# Patient Record
Sex: MALE | Race: WHITE | Employment: FULL TIME | URBAN - METROPOLITAN AREA
[De-identification: names, ages, dates, MRNs, and addresses within clinical notes are randomized per-mention and may not be internally consistent; named-entity substitution may affect disease eponyms.]

---

## 2017-01-04 ENCOUNTER — TELEPHONE (OUTPATIENT)
Dept: SURGERY | Facility: CLINIC | Age: 57
End: 2017-01-04

## 2017-01-04 NOTE — TELEPHONE ENCOUNTER
Phoned pt and spoke with him. Read to him 's result note as outlined below in this encounter, in it's entirety. This call took appx 15 minutes. Pt understood English and spoke [place], however had to repeat several times and answered many questions.

## 2017-01-12 ENCOUNTER — HOSPITAL ENCOUNTER (OUTPATIENT)
Dept: CT IMAGING | Facility: HOSPITAL | Age: 57
Discharge: HOME | End: 2017-01-12
Attending: UROLOGY
Payer: COMMERCIAL

## 2017-01-12 DIAGNOSIS — R31.29 MICROHEMATURIA: ICD-10-CM

## 2017-01-12 LAB — CREAT BLD-MCNC: 1.3 MG/DL (ref 0.5–1.5)

## 2017-01-12 PROCEDURE — 76376 3D RENDER W/INTRP POSTPROCES: CPT

## 2017-01-12 PROCEDURE — 82565 ASSAY OF CREATININE: CPT

## 2017-01-12 PROCEDURE — 74178 CT ABD&PLV WO CNTR FLWD CNTR: CPT

## 2017-02-02 ENCOUNTER — OFFICE VISIT (OUTPATIENT)
Dept: SURGERY | Facility: CLINIC | Age: 57
End: 2017-02-02

## 2017-02-02 VITALS
SYSTOLIC BLOOD PRESSURE: 152 MMHG | HEART RATE: 72 BPM | RESPIRATION RATE: 16 BRPM | DIASTOLIC BLOOD PRESSURE: 60 MMHG | BODY MASS INDEX: 24.11 KG/M2 | HEIGHT: 66 IN | WEIGHT: 150 LBS | TEMPERATURE: 98 F

## 2017-02-02 DIAGNOSIS — R31.29 MICROHEMATURIA: Primary | ICD-10-CM

## 2017-02-02 PROCEDURE — 52000 CYSTOURETHROSCOPY: CPT | Performed by: UROLOGY

## 2017-02-02 PROCEDURE — 99213 OFFICE O/P EST LOW 20 MIN: CPT | Performed by: UROLOGY

## 2017-02-02 RX ORDER — SILDENAFIL 100 MG/1
100 TABLET, FILM COATED ORAL
Qty: 6 TABLET | Refills: 11 | Status: SHIPPED | OUTPATIENT
Start: 2017-02-02

## 2017-02-02 NOTE — PROGRESS NOTES
Tamiko Nguyen is a 61year old male. HPI:   Patient presents with:  Hematuria: here for cystoscopy      50-year-old male presents for office cystoscopy in follow-up to a previous visit December 15, 2016.   He was referred for persistent microscopic hema ACOSTARSE Disp:  Rfl: 0       Allergies:  No Known Allergies      ROS:       PHYSICAL EXAM:   Karis Barraza  : 1960  Referring Physician: No ref.  provider found     Patient presents with:  Hematuria: here for cystoscopy          CYSTOSCOPY    Anest

## 2017-02-08 ENCOUNTER — TELEPHONE (OUTPATIENT)
Dept: SURGERY | Facility: CLINIC | Age: 57
End: 2017-02-08

## 2017-02-08 NOTE — TELEPHONE ENCOUNTER
Lmtcb. When patient calls back, please transfer to RN [phone] or [phone]. Thanks. Spoke to patient. Patient was given his test results at his last office visit on 2/2/17.

## 2017-02-08 NOTE — TELEPHONE ENCOUNTER
----- Message from Gennaro Palmer MD sent at 1/29/2017  9:47 AM CST -----  Staff please call this patient. Inform him that I have reviewed his CT Urogram.  Findings are as follows:  -There is a mass in the right kidney which contains small amounts of fat.

## 2017-05-19 ENCOUNTER — TELEPHONE (OUTPATIENT)
Dept: SURGERY | Facility: CLINIC | Age: 57
End: 2017-05-19

## 2017-05-19 NOTE — TELEPHONE ENCOUNTER
Phoned Chirstelle back and spoke with her. Informed her the below mentioned cysto was performed in the clinic. She was not aware of this and asked that I fax the notes. Notes faxed as requested to number outlined below. Confirmation of transmission received.

## 2017-05-19 NOTE — TELEPHONE ENCOUNTER
Candida Segundo from [facility] called. Patient had am out patient procedure on 2/2/17 with JOJO. Procedure was denied due to no prior auth. Candida Segundo is asking if you could get a PA for this.  Also, could you please fax over office notes leading up

## 2017-06-11 ENCOUNTER — HOSPITAL ENCOUNTER (OUTPATIENT)
Dept: MRI IMAGING | Facility: HOSPITAL | Age: 57
Discharge: HOME | End: 2017-06-11
Attending: INTERNAL MEDICINE
Payer: COMMERCIAL

## 2017-06-11 DIAGNOSIS — I77.1 STENOSIS OF LEFT ILIAC ARTERY (HCC): ICD-10-CM

## 2017-06-11 PROCEDURE — 82565 ASSAY OF CREATININE: CPT

## 2017-06-11 PROCEDURE — 74185 MRA ABD W OR W/O CNTRST: CPT | Performed by: INTERNAL MEDICINE

## 2017-06-11 PROCEDURE — A9575 INJ GADOTERATE MEGLUMI 0.1ML: HCPCS | Performed by: RADIOLOGY

## 2017-06-19 ENCOUNTER — TELEPHONE (OUTPATIENT)
Dept: SURGERY | Facility: CLINIC | Age: 57
End: 2017-06-19

## 2017-06-19 NOTE — TELEPHONE ENCOUNTER
Dr. Queen Kim office called. Requesting recent office notes. They will be sending  a LETICIA. Please fax to [phone]. Thank you.

## 2018-08-11 ENCOUNTER — APPOINTMENT (OUTPATIENT)
Dept: GENERAL RADIOLOGY | Age: 58
End: 2018-08-11
Attending: EMERGENCY MEDICINE
Payer: COMMERCIAL

## 2018-08-11 ENCOUNTER — HOSPITAL ENCOUNTER (OUTPATIENT)
Age: 58
Discharge: HOME | End: 2018-08-11
Attending: EMERGENCY MEDICINE
Payer: COMMERCIAL

## 2018-08-11 VITALS
RESPIRATION RATE: 16 BRPM | BODY MASS INDEX: 24 KG/M2 | TEMPERATURE: 98 F | HEART RATE: 97 BPM | SYSTOLIC BLOOD PRESSURE: 126 MMHG | WEIGHT: 150 LBS | OXYGEN SATURATION: 99 % | DIASTOLIC BLOOD PRESSURE: 67 MMHG

## 2018-08-11 DIAGNOSIS — G47.09 OTHER INSOMNIA: ICD-10-CM

## 2018-08-11 DIAGNOSIS — S61.211A LACERATION OF LEFT INDEX FINGER WITHOUT FOREIGN BODY WITHOUT DAMAGE TO NAIL, INITIAL ENCOUNTER: Primary | ICD-10-CM

## 2018-08-11 DIAGNOSIS — IMO0002 TENDON LACERATION: ICD-10-CM

## 2018-08-11 PROCEDURE — 99213 OFFICE O/P EST LOW 20 MIN: CPT

## 2018-08-11 PROCEDURE — 73140 X-RAY EXAM OF FINGER(S): CPT | Performed by: EMERGENCY MEDICINE

## 2018-08-11 PROCEDURE — 90471 IMMUNIZATION ADMIN: CPT

## 2018-08-11 PROCEDURE — 12002 RPR S/N/AX/GEN/TRNK2.6-7.5CM: CPT

## 2018-08-11 PROCEDURE — 99203 OFFICE O/P NEW LOW 30 MIN: CPT

## 2018-08-11 RX ORDER — IBUPROFEN 600 MG/1
600 TABLET ORAL ONCE
Status: COMPLETED | OUTPATIENT
Start: 2018-08-11 | End: 2018-08-11

## 2018-08-11 RX ORDER — CEPHALEXIN 500 MG/1
500 CAPSULE ORAL 4 TIMES DAILY
Qty: 21 CAPSULE | Refills: 0 | Status: SHIPPED | OUTPATIENT
Start: 2018-08-11 | End: 2018-09-27 | Stop reason: ALTCHOICE

## 2018-08-11 RX ORDER — ZOLPIDEM TARTRATE 10 MG/1
10 TABLET ORAL NIGHTLY PRN
Qty: 10 TABLET | Refills: 0 | Status: SHIPPED | OUTPATIENT
Start: 2018-08-11

## 2018-08-11 NOTE — ED NOTES
Wound cleaned with shurcleans and normal saline irrigation pressure dressing applied DPT given left delt. Plan of care reviewed and po motrin for pain given.  waiting for xray

## 2018-08-11 NOTE — ED INITIAL ASSESSMENT (HPI)
Sustained approx 2in lac to galindo side left index finger from a saw at work today. ?tendon visible with good rang of motion

## 2018-08-11 NOTE — ED NOTES
Instructed to please refrained from phone use while discharge order and inst provided.  Prescriptions reviewed and wound care discussed to leave dressing on keep clean dry and call hand in Monday for follow up motrin for pain bacitracin/ gauze tube gauze an

## 2018-08-11 NOTE — ED PROVIDER NOTES
Patient Seen in: [facility] In [address] [place] [address]    History   Patient presents with:  Laceration Abrasion (integumentary)    Stated Complaint: left finger lac    HPI    Patient presents to the urgent care today with complaint of laceration t other systems reviewed and negative except as noted above. PSFH elements reviewed from today and agreed except as otherwise stated in HPI.     Physical Exam   ED Triage Vitals [08/11/18 1157]  BP: 126/67  Pulse: 97  Resp: 16  Temp: 97.9 °F (36.6 °C)  Tem epinephrine. Total 5 mL. I then placed total of 5 4-0 nylon suture simple interrupted. After closure he could fully flex.   No complications    Patient then had cleansing bacitracin bandage and splint I did discuss with him the importance of follow-up re

## 2018-08-13 ENCOUNTER — HOSPITAL ENCOUNTER (OUTPATIENT)
Age: 58
Discharge: LEFT BEFORE DISCHARGE | End: 2018-08-13
Payer: COMMERCIAL

## 2018-09-27 ENCOUNTER — OFFICE VISIT (OUTPATIENT)
Dept: SURGERY | Facility: CLINIC | Age: 58
End: 2018-09-27
Payer: COMMERCIAL

## 2018-09-27 DIAGNOSIS — S61.211A LACERATION WITHOUT FOREIGN BODY OF LEFT INDEX FINGER WITHOUT DAMAGE TO NAIL, INITIAL ENCOUNTER: Primary | ICD-10-CM

## 2018-09-27 PROCEDURE — 99212 OFFICE O/P EST SF 10 MIN: CPT | Performed by: PLASTIC SURGERY

## 2018-09-27 PROCEDURE — 99243 OFF/OP CNSLTJ NEW/EST LOW 30: CPT | Performed by: PLASTIC SURGERY

## 2018-09-27 RX ORDER — CYPROHEPTADINE HYDROCHLORIDE 4 MG/1
TABLET ORAL
Refills: 1 | COMMUNITY
Start: 2018-08-24

## 2018-09-27 NOTE — H&P
Injury 1: Laceration to LIF  - Date: 08/11/18  - Days Since: 52    Murray Billingsley is a 60year old male that presents with Patient presents with: Injury: LIF laceration  . REFERRED BY:  No ref.  provider found    Pacemaker: No  Latex Allergy: no  Coumad Tartrate 10 MG Oral Tab Take 1 tablet (10 mg total) by mouth nightly as needed for Sleep. Disp: 10 tablet Rfl: 0   Sildenafil Citrate (VIAGRA) 100 MG Oral Tab Take 1 tablet (100 mg total) by mouth daily as needed for Erectile Dysfunction.  Disp: 6 tablet Rf Not Asked        Past Sunlamp Treatments for Acne: Not Asked        History of tanning: Not Asked        Hx of Spending [place] of Time in Sun: Not Asked        Bad sunburns in the past: Not Asked        Tanning Salons in the Past: Not Asked        Hx o travel plans. I also explained to him that after he returns, this will be approximately 3 months old, and we can probably not repair the nerve at that time. For now, will placement therapy to increase his range of motion and strength.       9/27/2018

## 2021-03-12 NOTE — TELEPHONE ENCOUNTER
----- Message from Miryam Ruiz MD sent at 12/30/2016 10:45 AM CST -----  Staff please call this patient.   Let him know that his recent urinalysis did demonstrate abnormal levels of blood in the urine and as per our discussion I would recommend proceedin [Dear  ___] : Dear  [unfilled], [Please see my note below.] : Please see my note below. [Consult Closing:] : Thank you very much for allowing me to participate in the care of this patient.  If you have any questions, please do not hesitate to contact me. [Sincerely,] : Sincerely, [FreeTextEntry3] : Fabiola Becker MD\par Director, Pediatric Epilepsy\par [facility]\par , Pediatric Neurology Residency Program\par ,\par [employer] [facility]\par [address] [place] [address], [facility] [address]\par [place] [address]\par Phone: [phone]\par Fax: [phone]\par \par

## (undated) NOTE — MR AVS SNAPSHOT
[place]  [address]  [phone]               Thank you for choosing us for your health care visit with Shaun Vu MD.  We are glad to serve you and happy to provide you with this summary These medications were sent to [id number]- [address] [phone], [phone]  [address]     Phone:  [phone]    - Sildenafil Citrate 100 MG Tabs            Follow-up Instructions

## (undated) NOTE — LETTER
18      Patient: Matt García  : 1960 Visit date: 2018    Dear Barber Winters,      I examined your patient in consultation today.     He presents with left index finger stiffness as well as numbness secondary to a radial digita